# Patient Record
Sex: MALE | Race: WHITE | NOT HISPANIC OR LATINO | Employment: FULL TIME | URBAN - METROPOLITAN AREA
[De-identification: names, ages, dates, MRNs, and addresses within clinical notes are randomized per-mention and may not be internally consistent; named-entity substitution may affect disease eponyms.]

---

## 2024-01-22 ENCOUNTER — OFFICE VISIT (OUTPATIENT)
Dept: URGENT CARE | Facility: URGENT CARE | Age: 39
End: 2024-01-22
Payer: COMMERCIAL

## 2024-01-22 VITALS
SYSTOLIC BLOOD PRESSURE: 132 MMHG | OXYGEN SATURATION: 99 % | HEART RATE: 77 BPM | RESPIRATION RATE: 12 BRPM | TEMPERATURE: 97.8 F | WEIGHT: 219.8 LBS | DIASTOLIC BLOOD PRESSURE: 86 MMHG

## 2024-01-22 DIAGNOSIS — J01.00 ACUTE NON-RECURRENT MAXILLARY SINUSITIS: Primary | ICD-10-CM

## 2024-01-22 PROCEDURE — 99203 OFFICE O/P NEW LOW 30 MIN: CPT | Performed by: STUDENT IN AN ORGANIZED HEALTH CARE EDUCATION/TRAINING PROGRAM

## 2024-01-22 RX ORDER — BUSPIRONE HYDROCHLORIDE 30 MG/1
30 TABLET ORAL 2 TIMES DAILY
COMMUNITY

## 2024-01-22 RX ORDER — LEVOTHYROXINE SODIUM 88 UG/1
88 TABLET ORAL DAILY
COMMUNITY

## 2024-01-22 RX ORDER — PROPRANOLOL HCL 60 MG
60 CAPSULE, EXTENDED RELEASE 24HR ORAL DAILY
COMMUNITY

## 2024-01-22 RX ORDER — CITALOPRAM HYDROBROMIDE 40 MG/1
1 TABLET ORAL DAILY
COMMUNITY
Start: 2022-07-19

## 2024-01-22 RX ORDER — HYDROXYZINE HYDROCHLORIDE 25 MG/1
25 TABLET, FILM COATED ORAL EVERY 6 HOURS PRN
COMMUNITY

## 2024-01-22 NOTE — PROGRESS NOTES
Assessment & Plan     Acute non-recurrent maxillary sinusitis  Advised patient to monitor symptoms over the course of this week and if symptoms are progressively worsening he can fill the antibiotic prescription on 1/26 of which the prescription is dated. Otherwise if symptoms are improving, do not fill prescription for antibiotic.   - amoxicillin-clavulanate (AUGMENTIN) 875-125 MG tablet  Dispense: 14 tablet; Refill: 0       No follow-ups on file.    SENTHIL [facility]RG[facility]     Kleber is a 38 year old male who presents to clinic today for the following health issues:  Chief Complaint   Patient presents with    Cough     Chest congestion, shortness of breath, nasal congestion, productive cough. Sx 1 week, has not tested Covid.      HPI    Wife sent him in. He is feeling a little better today.     Review of Systems  Constitutional, HEENT, cardiovascular, pulmonary, gi and gu systems are negative, except as otherwise noted.      Objective    /86 (BP Location: Right arm, Cuff Size: Adult Large)   Pulse 77   Temp 97.8  F (36.6  C) (Tympanic)   Resp 12   Wt 99.7 kg (219 lb 12.8 oz)   SpO2 99%   Physical Exam   GENERAL: alert and no distress  EYES: Eyes grossly normal to inspection, PERRL and conjunctivae and sclerae normal  HENT: ear canals and TM's normal, nose and mouth without ulcers or lesions  NECK: no adenopathy, no asymmetry, masses, or scars  RESP: lungs clear to auscultation - no rales, rhonchi or wheezes  CV: regular rate and rhythm, normal S1 S2, no S3 or S4, no murmur, click or rub, no peripheral edema  MS: no gross musculoskeletal defects noted, no edema  SKIN: no suspicious lesions or rashes  NEURO: Normal strength and tone, mentation intact and speech normal    No results found for this or any previous visit (from the past 24 hour(s)).

## 2025-06-10 ENCOUNTER — OFFICE VISIT (OUTPATIENT)
Dept: URGENT CARE | Facility: URGENT CARE | Age: 40
End: 2025-06-10
Payer: COMMERCIAL

## 2025-06-10 VITALS
DIASTOLIC BLOOD PRESSURE: 80 MMHG | HEIGHT: 72 IN | RESPIRATION RATE: 24 BRPM | TEMPERATURE: 98.7 F | OXYGEN SATURATION: 98 % | HEART RATE: 68 BPM | WEIGHT: 205.4 LBS | BODY MASS INDEX: 27.82 KG/M2 | SYSTOLIC BLOOD PRESSURE: 124 MMHG

## 2025-06-10 DIAGNOSIS — K64.4 EXTERNAL HEMORRHOIDS: Primary | ICD-10-CM

## 2025-06-10 PROCEDURE — 99213 OFFICE O/P EST LOW 20 MIN: CPT | Performed by: EMERGENCY MEDICINE

## 2025-06-10 PROCEDURE — 3079F DIAST BP 80-89 MM HG: CPT | Performed by: EMERGENCY MEDICINE

## 2025-06-10 PROCEDURE — 3074F SYST BP LT 130 MM HG: CPT | Performed by: EMERGENCY MEDICINE

## 2025-06-10 PROCEDURE — 1125F AMNT PAIN NOTED PAIN PRSNT: CPT | Performed by: EMERGENCY MEDICINE

## 2025-06-10 ASSESSMENT — PAIN SCALES - GENERAL: PAINLEVEL_OUTOF10: MILD PAIN (2)

## 2025-06-10 NOTE — PATIENT INSTRUCTIONS
Witch Hazel wipes after bowel movements and/or throughout day, Epsom salt in warm bath 2 times daily, preparation H, clenching exercises. Do not strain during bowel movements as much as possible. Backing off on alcohol is helpful as well if you drink. Miralax (stool softener) to keep things less straining.

## 2025-06-10 NOTE — PROGRESS NOTES
Urgent Care Clinic Visit    Chief Complaint   Patient presents with    Rectal Problem     Patient seen today for hemorrhoids. He states this flared up about a week ago.

## 2025-06-11 NOTE — PROGRESS NOTES
"Assessment & Plan     Diagnosis:    ICD-10-CM    1. External hemorrhoids  K64.4 phenylephrine-cocoa butter (PREPARATION H) 0.25-88.44 % suppository          Medical Decision Making:  Kleber Mac is a 40 year old male who presents for evaluation of anorectal pain.  There has been no associated bleeding with this.  A broad differential for their pain was considered including fissure, hemorrhoid, mass/tumor, perirectal abscess, inflammatory bowel disease, foreign body, etc.  The workup and exam findings indicated that patient is having pain from acute external hemorrhoids.   There are no signs of worrisome findings to warrant further workup.   Medications for discharge are noted above and per discharge instructions. Patient verbalizes understanding and agreement with the plan including reasons to return. All questions answered.     Isra Butler PA-C  [facility] URGENT [facility]    Subjective     Kleber Mac is a 40 year old male who presents to clinic today for the following health issues:  Chief Complaint   Patient presents with    Rectal Problem     Patient seen today for hemorrhoids. He states this flared up about a week ago.       HPI  Patient with hemorrhoid history and feels he has had one for the past week. Minor pain. No bleeding. Had colonoscopy last year which was \"normal\" except for hemorrhoids. No abdominal pain, dark or bloody stools, fever or other concerns.     Review of Systems    See HPI    Objective      Vitals: /80 (BP Location: Right arm, Patient Position: Sitting, Cuff Size: Adult Large)   Pulse 68   Temp 98.7  F (37.1  C) (Oral)   Resp 24   Ht 1.829 m (6')   Wt 93.2 kg (205 lb 6.4 oz)   SpO2 98%   BMI 27.86 kg/m      Patient Vitals for the past 24 hrs:   BP Temp Temp src Pulse Resp SpO2 Height Weight   06/10/25 1755 124/80 98.7  F (37.1  C) Oral 68 24 98 % 1.829 m (6') 93.2 kg (205 lb 6.4 oz)       Vital signs reviewed by: Isra Butler PA-C    Physical Exam "   Constitutional: Patient is alert and cooperative. No acute distress.  Neurological: Alert and oriented x3.  : external hemorrhoid.  Nonthrombosed.  No anal fissure or rectal bleeding.  Skin: No rash noted on visualized skin.  Psychiatric:The patient has a normal mood and affect.     Isra Butler PA-C, Allie 10, 2025